# Patient Record
Sex: FEMALE | Race: WHITE | NOT HISPANIC OR LATINO | Employment: OTHER | URBAN - METROPOLITAN AREA
[De-identification: names, ages, dates, MRNs, and addresses within clinical notes are randomized per-mention and may not be internally consistent; named-entity substitution may affect disease eponyms.]

---

## 2024-04-04 ENCOUNTER — OFFICE VISIT (OUTPATIENT)
Dept: GASTROENTEROLOGY | Facility: CLINIC | Age: 34
End: 2024-04-04
Payer: OTHER GOVERNMENT

## 2024-04-04 VITALS
SYSTOLIC BLOOD PRESSURE: 106 MMHG | WEIGHT: 214 LBS | HEIGHT: 66 IN | OXYGEN SATURATION: 100 % | DIASTOLIC BLOOD PRESSURE: 74 MMHG | HEART RATE: 71 BPM | BODY MASS INDEX: 34.39 KG/M2

## 2024-04-04 DIAGNOSIS — K58.1 IRRITABLE BOWEL SYNDROME WITH CONSTIPATION: Primary | ICD-10-CM

## 2024-04-04 RX ORDER — LAMOTRIGINE 200 MG/1
1 TABLET ORAL DAILY
COMMUNITY

## 2024-04-04 RX ORDER — PAROXETINE HYDROCHLORIDE HEMIHYDRATE 25 MG/1
25 TABLET, FILM COATED, EXTENDED RELEASE ORAL EVERY MORNING
COMMUNITY

## 2024-04-04 RX ORDER — TRAZODONE HYDROCHLORIDE 100 MG/1
100 TABLET ORAL NIGHTLY
COMMUNITY

## 2024-04-04 NOTE — PROGRESS NOTES
"Chief Complaint  Irritable Bowel Syndrome and Constipation    Lucrecia Manzanares is a 33 y.o. female who presents to [facility] on referral from Fercho Hollis MD for a gastroenterology evaluation of IBS.      History of Present Illness  New patient presents to the office for IBS - constipation.  Patient established with gastroenterologist in [place] and ultimately underwent colonoscopy in 2021 that was normal other than internal hemorrhoids.  Patient is  and recently relocated to the area.  Patient currently takes Linzess 290 which provides a bowel movement every morning.  When she has increased stress or is out of her routine she will have more constipation which is relieved with MiraLAX as needed.  Denies abdominal pain, melena, and hematochezia.  Denies upper GI symptoms.    History reviewed. No pertinent past medical history.    Past Surgical History:   Procedure Laterality Date    COLONOSCOPY  2021    [place]    TUBAL ABDOMINAL LIGATION           Current Outpatient Medications:     lamoTRIgine (LaMICtal) 200 MG tablet, Take 1 tablet by mouth Daily., Disp: , Rfl:     linaclotide (LINZESS) 290 MCG capsule capsule, Take 1 capsule by mouth Every Morning Before Breakfast., Disp: , Rfl:     PARoxetine CR (PAXIL-CR) 25 MG 24 hr tablet, Take 1 tablet by mouth Every Morning., Disp: , Rfl:     DRWLEV-VHQGF-XSDIVT-FA-FISHOIL PO, Take  by mouth., Disp: , Rfl:     traZODone (DESYREL) 100 MG tablet, Take 1 tablet by mouth Every Night., Disp: , Rfl:      No Known Allergies    Family History   Problem Relation Age of Onset    Colon cancer Neg Hx         Social History     Social History Narrative    Not on file       Immunization:    There is no immunization history on file for this patient.     Objective     Vital Signs:   /74 (BP Location: Right arm, Patient Position: Sitting, Cuff Size: Adult)   Pulse 71   Ht 167.6 cm (66\")   Wt 97.1 kg (214 lb)   SpO2 100%   BMI 34.54 " kg/m²       Physical Exam  Constitutional:       Appearance: Normal appearance. She is normal weight.   HENT:      Head: Normocephalic and atraumatic.      Nose: Nose normal.   Pulmonary:      Effort: Pulmonary effort is normal.   Skin:     General: Skin is warm and dry.   Neurological:      Mental Status: She is alert and oriented to person, place, and time. Mental status is at baseline.   Psychiatric:         Mood and Affect: Mood normal.         Behavior: Behavior normal.         Thought Content: Thought content normal.         Judgment: Judgment normal.         Result Review :                 Assessment and Plan    Diagnoses and all orders for this visit:    1. Irritable bowel syndrome with constipation (Primary)    33-year-old new patient presents to the office for IBS - constipation.  Patient established with gastroenterologist in [place] and ultimately underwent colonoscopy in 2021 that was normal other than internal hemorrhoids.  Patient is  and recently relocated to the area.  Patient currently takes Linzess 290 which provides a bowel movement every morning.  When she has increased stress or is out of her routine she will have more constipation which is relieved with MiraLAX as needed.  Overall patient has adequate management of IBS constipation symptoms.  She will continue to follow up with us on an annual basis for refills of Linzess.  She will call the office for any questions or concerns.  Patient is agreeable to plan.    Follow Up   Return in about 1 year (around 4/4/2025) for IBS-constipation.  Patient was given instructions and counseling regarding her condition or for health maintenance advice. Please see specific information pulled into the AVS if appropriate.

## 2024-04-05 ENCOUNTER — PATIENT ROUNDING (BHMG ONLY) (OUTPATIENT)
Dept: GASTROENTEROLOGY | Facility: CLINIC | Age: 34
End: 2024-04-05
Payer: OTHER GOVERNMENT

## 2024-04-05 NOTE — PROGRESS NOTES
"4/5/2024      Hello, may I speak with Lucrecia Manzanares     My name is Dylan. I am calling from [facility] [place]. I show that you had a recent visit with ALEC Sanchez.    Before we get started may I verify your date of birth? 1990    I am calling to officially welcome you to our practice and ask about your recent visit. Is this a good time to talk? Yes    Tell me about your visit with us. What things went well? \"My visit with Krysta went well. The staff were nice too. I enjoyed my visit.\"     We strive to ensure that we protect your safety and privacy. Is there anything we could have done to improve this during your visit?    No    We're always looking for ways to make our patients' experiences even better. Do you have recommendations on ways we may improve? No    Overall were you satisfied with your first visit to our practice? Yes    I appreciate you taking the time to speak with me today. Is there anything else I can do for you? No    I am glad to hear that you had a very good visit and I appreciate you taking the time to provide feedback on this call. We would greatly appreciate you filling out a survey if you receive one in the mail, email or text. This is a great opportunity to provide any additional feedback that you may think of after this call as well.       Thank you, and have a great day.    "